# Patient Record
Sex: FEMALE | URBAN - METROPOLITAN AREA
[De-identification: names, ages, dates, MRNs, and addresses within clinical notes are randomized per-mention and may not be internally consistent; named-entity substitution may affect disease eponyms.]

---

## 2022-07-12 ENCOUNTER — HOSPITAL ENCOUNTER (OUTPATIENT)
Dept: TELEMEDICINE | Facility: HOSPITAL | Age: 42
Discharge: HOME | End: 2022-07-12

## 2022-07-12 NOTE — CONSULTS
Telestroke Brief Note    Patient presenting with tingling of R 1st through 3rd fingers and blurry vision, now resolved.  No deficits noted on exam except paresthesias.  Low suspicion for stroke at this time.  Consider median neuropathy.  If exam changes, consider MRI brain (risk factors for stroke: HTN, T2DM).    Gaby Fallon MD  Vascular Neurology